# Patient Record
Sex: MALE | Race: BLACK OR AFRICAN AMERICAN | NOT HISPANIC OR LATINO
[De-identification: names, ages, dates, MRNs, and addresses within clinical notes are randomized per-mention and may not be internally consistent; named-entity substitution may affect disease eponyms.]

---

## 2023-11-20 ENCOUNTER — DASHBOARD ENCOUNTERS (OUTPATIENT)
Age: 43
End: 2023-11-20

## 2023-11-20 ENCOUNTER — OFFICE VISIT (OUTPATIENT)
Dept: URBAN - METROPOLITAN AREA CLINIC 118 | Facility: CLINIC | Age: 43
End: 2023-11-20
Payer: COMMERCIAL

## 2023-11-20 VITALS
WEIGHT: 166.4 LBS | HEIGHT: 69 IN | HEART RATE: 63 BPM | BODY MASS INDEX: 24.65 KG/M2 | TEMPERATURE: 98.4 F | SYSTOLIC BLOOD PRESSURE: 125 MMHG | DIASTOLIC BLOOD PRESSURE: 61 MMHG

## 2023-11-20 DIAGNOSIS — Z86.010 HISTORY OF COLON POLYPS: ICD-10-CM

## 2023-11-20 DIAGNOSIS — K58.1 IRRITABLE BOWEL SYNDROME WITH CONSTIPATION: ICD-10-CM

## 2023-11-20 PROBLEM — 428283002: Status: ACTIVE | Noted: 2023-11-20

## 2023-11-20 PROBLEM — 440630006: Status: ACTIVE | Noted: 2023-11-20

## 2023-11-20 PROCEDURE — 99204 OFFICE O/P NEW MOD 45 MIN: CPT | Performed by: STUDENT IN AN ORGANIZED HEALTH CARE EDUCATION/TRAINING PROGRAM

## 2023-11-20 RX ORDER — POLYETHYLENE GLYCOL-3350 AND ELECTROLYTES 236; 6.74; 5.86; 2.97; 22.74 G/274.31G; G/274.31G; G/274.31G; G/274.31G; G/274.31G
AS DIRECTED POWDER, FOR SOLUTION ORAL
Qty: 1 KIT | Refills: 0 | OUTPATIENT
Start: 2023-11-20 | End: 2023-11-21

## 2023-11-20 NOTE — HPI-TODAY'S VISIT:
The patient is a 43-year-old male with history of colon polyps who presents for surveillance, abdominal bloating, and to discuss the need for upper endoscopy. The patient presents with his young sont today.  The patient had a colonoscopy in 2016 which revealed polyps (pathology & colonoscopy report not available); repeat colonoscopy in 5 years was recommended (this is confirmed in referral notes).  The patient states that at the time he was having similar symptoms plusdiarrhea which prompted a colonoscopy.  He states that he had a pretty extensive work-up with an upper endoscopy as well as a swallow study for evaluation of his symptoms.  He does not remember the results for the work up aside from the need for colonoscopy for surveillance.  He complains today of altered bowel habits.  For the past few weeks he has noticed that he has had to push more to get his stool to come out, however the consistency has changed.  His stool is coming out as small pieces of mush in the toilet (he has a photo of it today).  He denies noting any blood in his stools.  He does also have abdominal bloating and intermittent discomfort associated with his bowel movements.  He sometimes has reflux but this is triggered by specific foods (he has not been able to determine the foods but it was recommended in the past that he keep a diary; he has not kept a food diary).  He wonders if he needs an endoscopy for further evaluation.

## 2023-11-27 ENCOUNTER — LAB OUTSIDE AN ENCOUNTER (OUTPATIENT)
Dept: URBAN - METROPOLITAN AREA CLINIC 118 | Facility: CLINIC | Age: 43
End: 2023-11-27

## 2024-01-10 ENCOUNTER — OFFICE VISIT (OUTPATIENT)
Dept: URBAN - METROPOLITAN AREA SURGERY CENTER 23 | Facility: SURGERY CENTER | Age: 44
End: 2024-01-10